# Patient Record
Sex: FEMALE | Race: BLACK OR AFRICAN AMERICAN
[De-identification: names, ages, dates, MRNs, and addresses within clinical notes are randomized per-mention and may not be internally consistent; named-entity substitution may affect disease eponyms.]

---
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
## 2020-10-10 ENCOUNTER — HOSPITAL ENCOUNTER (EMERGENCY)
Dept: HOSPITAL 61 - ER | Age: 20
Discharge: HOME | End: 2020-10-10
Payer: MEDICAID

## 2020-10-10 VITALS — WEIGHT: 235.45 LBS | HEIGHT: 69 IN | BODY MASS INDEX: 34.87 KG/M2

## 2020-10-10 VITALS — SYSTOLIC BLOOD PRESSURE: 129 MMHG | DIASTOLIC BLOOD PRESSURE: 56 MMHG

## 2020-10-10 DIAGNOSIS — R63.0: ICD-10-CM

## 2020-10-10 DIAGNOSIS — A59.03: Primary | ICD-10-CM

## 2020-10-10 DIAGNOSIS — R11.0: ICD-10-CM

## 2020-10-10 LAB
APTT PPP: YELLOW S
BACTERIA #/AREA URNS HPF: 0 /HPF
BILIRUB UR QL STRIP: NEGATIVE
FIBRINOGEN PPP-MCNC: CLEAR MG/DL
NITRITE UR QL STRIP: NEGATIVE
PH UR STRIP: 6.5 [PH]
PROT UR STRIP-MCNC: NEGATIVE MG/DL
RBC #/AREA URNS HPF: (no result) /HPF (ref 0–2)
T VAGINALIS URNS QL MICRO: PRESENT
UROBILINOGEN UR-MCNC: 1 MG/DL

## 2020-10-10 PROCEDURE — 81025 URINE PREGNANCY TEST: CPT

## 2020-10-10 PROCEDURE — 87086 URINE CULTURE/COLONY COUNT: CPT

## 2020-10-10 PROCEDURE — 81001 URINALYSIS AUTO W/SCOPE: CPT

## 2020-10-10 PROCEDURE — 99284 EMERGENCY DEPT VISIT MOD MDM: CPT

## 2020-10-10 NOTE — PHYS DOC
General Adult


EDM:


Chief Complaint:  PREGNANCY TEST





HPI:


HPI:





Patient is a 20  year old female who presents with states that her last period 

was on September 15.  She states she has not actually missed her period yet but 

is having increased appetite, slight nausea and some bloating.  Patient is here 

today for a pregnancy test.  She denies vaginal discharge, vaginal bleeding, 

abdominal pain, vomiting, diarrhea, fever, dysuria symptoms, sexually 

transmitted disease to concerns, dizziness, headache.  Patient denies any past 

medical history.


 (SADIE NELSON APRN)





Review of Systems:


Review of Systems:


Constitutional:   Denies fever or chills. []


Eyes:   Denies change in visual acuity. []


HENT:   Denies nasal congestion or sore throat. [] 


Respiratory:   Denies cough or shortness of breath. [] 


Cardiovascular:   Denies chest pain or edema. [] 


GI:   Denies abdominal pain. + nausea, +bloating, +increased appetite.  Denies 

vomiting, bloody stools or diarrhea. [] 


:  Denies dysuria. [] 


Musculoskeletal:   Denies back pain or joint pain. [] 


Integument:   Denies rash. [] 


Neurologic:   Denies headache, focal weakness or sensory changes. [] 


Endocrine:   Denies polyuria or polydipsia. [] 


Lymphatic:  Denies swollen glands. [] 


Psychiatric:  Denies depression or anxiety. []


 (SADIE NELSON APRN)





Heart Score:


Risk Factors:


Risk Factors:  DM, Current or recent (<one month) smoker, HTN, HLP, family 

history of CAD, obesity.


Risk Scores:


Score 0 - 3:  2.5% MACE over next 6 weeks - Discharge Home


Score 4 - 6:  20.3% MACE over next 6 weeks - Admit for Clinical Observation


Score 7 - 10:  72.7% MACE over next 6 weeks - Early Invasive Strategies


 (SADIE NELSON APRN)





Physical Exam:


PE:





Constitutional: Well developed, well nourished, no acute distress, non-toxic 

appearance. []


HENT: Normocephalic, atraumatic, bilateral external ears normal, oropharynx 

moist, no oral exudates, nose normal. []


Eyes: PERRLA, EOMI, conjunctiva normal, no discharge. [] 


Neck: Normal range of motion, no tenderness, supple, no stridor. [] 


Cardiovascular:Heart rate regular rhythm, no murmur []


Lungs & Thorax:  Bilateral breath sounds clear to auscultation []


Abdomen: Bowel sounds normal, soft, no tenderness, no masses, no pulsatile 

masses. [] 


Skin: Warm, dry, no erythema, no rash. [] 


Back: No tenderness, no CVA tenderness. [] 


Extremities: No tenderness, no cyanosis, no clubbing, ROM intact, no edema. [] 


Neurologic: Alert and oriented X 3, normal motor function, normal sensory 

function, no focal deficits noted. []


Psychologic: Affect normal, judgement normal, mood normal. 





**Normal physical exam []


 (SADIE NELSON)





Current Patient Data:


Labs:





                                Laboratory Tests








Test


 10/10/20


21:47


 


POC Urine HCG, Qualitative


 Hcg negative


(Negative)








 (SADIE NELSON)





EKG:


EKG:


[]


 (SADIE NELSON)





Radiology/Procedures:


Radiology/Procedures:


[]


 (SADIE NELSON)





Course & Med Decision Making:


Course & Med Decision Making


Pertinent Labs and Imaging studies reviewed. (See chart for details)





See HPI.  Alert and oriented.  Speaks in full complete sentences.  Ambulatory 

with a steady gait.  Abdomen is soft and nontender.  Vital signs within normal 

limits.  Patient states she is eating and drinking appropriately.  Urine 

negative pregnancy.





Urinalysis shows trichomonas present.  Patient left before urinalysis was done. 

 Nurse Lissette RN calling the patient and calling in a prescription for Metroni

dazole 500mg BID for 7 days. 


[]


 (SADIE NELSON)





Dragon Disclaimer:


Dragon Disclaimer:


This electronic medical record was generated, in whole or in part, using a voice

 recognition dictation system.


 (SADIE NELSON)





Departure


Departure


Impression:  


   Primary Impression:  


   Pregnancy test negative


   Additional Impression:  


   Trichomoniasis of bladder


Disposition:  01 DC HOME SELF CARE/HOMELESS


Condition:  STABLE


Referrals:  


NO PCP (PCP)


Patient Instructions:  Medical Screening Exam





Additional Instructions:  


Drink plenty of fluids.  Follow-up with a gynecologist if needed.





Attending Signature


Attending Signature


I have reviewed the PA/NP's note and plan of care. I was available for 

consultation as needed during the patient's visit in the emergency department. I

 agree with the clinical impression, plan, and disposition.


 (MARLA FREEMAN DO)











SADIE NELSON            Oct 10, 2020 21:56


MARLA FREEMAN DO             Oct 10, 2020 23:10